# Patient Record
Sex: MALE | Race: WHITE | NOT HISPANIC OR LATINO
[De-identification: names, ages, dates, MRNs, and addresses within clinical notes are randomized per-mention and may not be internally consistent; named-entity substitution may affect disease eponyms.]

---

## 2020-09-22 PROBLEM — Z00.00 ENCOUNTER FOR PREVENTIVE HEALTH EXAMINATION: Status: ACTIVE | Noted: 2020-09-22

## 2020-09-23 ENCOUNTER — APPOINTMENT (OUTPATIENT)
Dept: UROLOGY | Facility: CLINIC | Age: 70
End: 2020-09-23
Payer: MEDICARE

## 2020-09-23 PROCEDURE — 99214 OFFICE O/P EST MOD 30 MIN: CPT | Mod: 95

## 2020-09-23 NOTE — HISTORY OF PRESENT ILLNESS
PCP- Dr. Noel [Home] : at home, [unfilled] , at the time of the visit. [Other Location: e.g. Home (Enter Location, City,State)___] : at [unfilled] [Verbal consent obtained from patient] : the patient, [unfilled] [FreeTextEntry1] :  The patient-doctor relationship has been established in a face to face fashion via real time video/audio HIPAA compliant communication using telemedicine software. The patient's identity has been confirmed. The patient was previously emailed a copy of the telemedicine consent. They have had a chance to review and has now given verbal consent and has requested care to be assessed and treated through telemedicine and understands there maybe limitations in this process and they may need further follow up care in the office and or hospital settings. Patient was unable to connect using the [employer] portal and requested an alternative platfrm (Face Time).\par Verbal consent given on 9/23/2020, at 11:10 AM, by Pierre Cleveland.\par \par This 69 year old male was last sen on 10/18/2019 for BPH on tamsulosin 0.8. He tells me that he definitely urinates better on the medication and requests a refill. On his last visit the PVR was 125 cc, and the prostate measured 30 gm. \par \par Patient also known to have a 4 cm renal cist in each kidney and a 7 mm right lower pole AML and all of thee have been stable as of last US 8/29/2019. LAst PSA from 7/19/2019 stable at 1.18.\par \par Patient tells me that his cholesterol is stable on medication and NKMA.\par \par  The patient denies fevers, chills, nausea and or vomiting and no unexplained weight loss. \par All pertinent parts of the patient PFSH (past medical, family and social histories), laboratory, radiological studies and physician notes were reviewed prior to starting the face to face portion of the telemedicine visit. Questionnaire results were discussed with patient.\par

## 2020-09-23 NOTE — ASSESSMENT
[FreeTextEntry1] : We discussed continued use of the tamsulosin and this was reordered. We also discussed PSA evaluation at this time and he will have this done with Linus during his upcoming visit. I also ordered a renal US for interval re-evaluation of the renal cysts and AML. If these results are all stable, then we will meet for FU in 6 months. We ended the video portion of the visit at 11:23 AM. Roseann Adan MD\par

## 2020-09-23 NOTE — HISTORY OF PRESENT ILLNESS
[Home] : at home, [unfilled] , at the time of the visit. [Other Location: e.g. Home (Enter Location, City,State)___] : at [unfilled] [Verbal consent obtained from patient] : the patient, [unfilled] [FreeTextEntry1] :  The patient-doctor relationship has been established in a face to face fashion via real time video/audio HIPAA compliant communication using telemedicine software. The patient's identity has been confirmed. The patient was previously emailed a copy of the telemedicine consent. They have had a chance to review and has now given verbal consent and has requested care to be assessed and treated through telemedicine and understands there maybe limitations in this process and they may need further follow up care in the office and or hospital settings. Patient was unable to connect using the [facility] portal and requested an alternative platfrm (Face Time).\par Verbal consent given on 9/23/2020, at 11:10 AM, by Pierre Cleveland.\par \par This 69 year old male was last sen on 10/18/2019 for BPH on tamsulosin 0.8. He tells me that he definitely urinates better on the medication and requests a refill. On his last visit the PVR was 125 cc, and the prostate measured 30 gm. \par \par Patient also known to have a 4 cm renal cist in each kidney and a 7 mm right lower pole AML and all of thee have been stable as of last US 8/29/2019. LAst PSA from 7/19/2019 stable at 1.18.\par \par Patient tells me that his cholesterol is stable on medication and NKMA.\par \par  The patient denies fevers, chills, nausea and or vomiting and no unexplained weight loss. \par All pertinent parts of the patient PFSH (past medical, family and social histories), laboratory, radiological studies and physician notes were reviewed prior to starting the face to face portion of the telemedicine visit. Questionnaire results were discussed with patient.\par

## 2020-09-23 NOTE — LETTER BODY
[Dear  ___] : Dear ~LASHANDA, [Please see my note below.] : Please see my note below. [Consult Closing:] : Thank you very much for allowing me to participate in the care of this patient.  If you have any questions, please do not hesitate to contact me. [FreeTextEntry2] : Barry Barriga MD [FreeTextEntry1] : I had the pleasure of evaluation of your patient today via a telehealth virtual visit.\par  [FreeTextEntry3] : Best personal regards,\par \par Roseann\par

## 2021-09-10 ENCOUNTER — NON-APPOINTMENT (OUTPATIENT)
Age: 71
End: 2021-09-10

## 2021-09-15 ENCOUNTER — APPOINTMENT (OUTPATIENT)
Dept: UROLOGY | Facility: CLINIC | Age: 71
End: 2021-09-15
Payer: MEDICARE

## 2021-09-15 VITALS
OXYGEN SATURATION: 100 % | SYSTOLIC BLOOD PRESSURE: 146 MMHG | HEART RATE: 66 BPM | WEIGHT: 180 LBS | HEIGHT: 69 IN | TEMPERATURE: 97.8 F | RESPIRATION RATE: 20 BRPM | DIASTOLIC BLOOD PRESSURE: 88 MMHG | BODY MASS INDEX: 26.66 KG/M2

## 2021-09-15 DIAGNOSIS — N28.1 CYST OF KIDNEY, ACQUIRED: ICD-10-CM

## 2021-09-15 DIAGNOSIS — N13.8 BENIGN PROSTATIC HYPERPLASIA WITH LOWER URINARY TRACT SYMPMS: ICD-10-CM

## 2021-09-15 DIAGNOSIS — D30.01 BENIGN NEOPLASM OF RIGHT KIDNEY: ICD-10-CM

## 2021-09-15 DIAGNOSIS — N40.1 BENIGN PROSTATIC HYPERPLASIA WITH LOWER URINARY TRACT SYMPMS: ICD-10-CM

## 2021-09-15 PROCEDURE — 99214 OFFICE O/P EST MOD 30 MIN: CPT

## 2021-09-15 PROCEDURE — 51798 US URINE CAPACITY MEASURE: CPT

## 2021-09-15 NOTE — ASSESSMENT
[FreeTextEntry1] : We discussed continued use of the tamsulosin at the 0.8 mg/day dosage since it has improved his LUTS and quality of life.  I renewed this medication for him today.  We also discussed the PSA which has been stable and this was repeated today.  If the PSA remains stable, I will see him in a year. Roseann Adan MD\par

## 2021-09-15 NOTE — LETTER BODY
[Dear  ___] : Dear ~LASHANDA, [Courtesy Letter:] : I had the pleasure of seeing your patient, [unfilled], in my office today. [Please see my note below.] : Please see my note below. [Consult Closing:] : Thank you very much for allowing me to participate in the care of this patient.  If you have any questions, please do not hesitate to contact me. [FreeTextEntry3] : Best personal regards,\par \par Roseann  [FreeTextEntry2] : Barry Barriga MD

## 2021-09-15 NOTE — HISTORY OF PRESENT ILLNESS
[FreeTextEntry1] : This 70 year old male was last sen on 10/18/2019 for BPH on tamsulosin 0.8. He tells me that he definitely urinates better on the medication and requests a refill. On his last visit the PVR was 125 cc, and the prostate measured 30 gm. \par \par Patient also known to have a 4 cm renal cyst in each kidney and a 7 mm right lower pole AML and all of thee have been stable as of last US 8/29/2019. LAst PSA from 7/19/2019 stable at 1.18.\par \par Patient seen TODAY 9/15/2021 to reassess his urination, check PSA and PVR, reorder medication. He told me that he is urinating better since being on the tamsulosin 0.8, although he still has nocturia x 2. He is otherwise sable.\par UA negative\par PVR 3 cc\par IPSS 15\par RAJAT 19\par CAROL 3\par \par Patient tells me that his cholesterol is stable on medication and NKMA.\par \par  The patient denies fevers, chills, nausea and or vomiting and no unexplained weight loss. \par All pertinent parts of the patient PFSH (past medical, family and social histories), laboratory, radiological studies and physician notes were reviewed prior to starting the face to face portion of the telemedicine visit. Questionnaire results were discussed with patient.\par

## 2021-09-15 NOTE — PHYSICAL EXAM
[General Appearance - Well Developed] : well developed [General Appearance - Well Nourished] : well nourished [Normal Appearance] : normal appearance [Well Groomed] : well groomed [General Appearance - In No Acute Distress] : no acute distress [Abdomen Soft] : soft [Abdomen Tenderness] : non-tender [Abdomen Hernia] : no hernia was discovered [Costovertebral Angle Tenderness] : no ~M costovertebral angle tenderness [Urethral Meatus] : meatus normal [Penis Abnormality] : normal circumcised penis [Scrotum] : the scrotum was normal [Epididymis] : the epididymides were normal [Testes Tenderness] : no tenderness of the testes [Testes Mass (___cm)] : there were no testicular masses [Prostate Tenderness] : the prostate was not tender [No Prostate Nodules] : no prostate nodules [Prostate Size ___ (0-4)] : prostate size [unfilled] (scale: 0-4) [Skin Turgor] : supple [] : no respiratory distress [Oriented To Time, Place, And Person] : oriented to person, place, and time [Affect] : the affect was normal [Mood] : the mood was normal [Not Anxious] : not anxious [Normal Station and Gait] : the gait and station were normal for the patient's age

## 2021-09-16 LAB — PSA SERPL-MCNC: 1.17 NG/ML

## 2021-09-29 ENCOUNTER — TRANSCRIPTION ENCOUNTER (OUTPATIENT)
Age: 71
End: 2021-09-29

## 2021-11-02 ENCOUNTER — OUTPATIENT (OUTPATIENT)
Dept: OUTPATIENT SERVICES | Facility: HOSPITAL | Age: 71
LOS: 1 days | Discharge: HOME | End: 2021-11-02
Payer: MEDICARE

## 2021-11-02 ENCOUNTER — RESULT REVIEW (OUTPATIENT)
Age: 71
End: 2021-11-02

## 2021-11-02 PROCEDURE — 88300 SURGICAL PATH GROSS: CPT | Mod: 26

## 2021-11-10 LAB — SURGICAL PATHOLOGY STUDY: SIGNIFICANT CHANGE UP

## 2022-09-10 RX ORDER — TAMSULOSIN HYDROCHLORIDE 0.4 MG/1
0.4 CAPSULE ORAL
Qty: 180 | Refills: 0 | Status: ACTIVE | COMMUNITY
Start: 2020-09-23 | End: 1900-01-01